# Patient Record
Sex: FEMALE | Race: BLACK OR AFRICAN AMERICAN | URBAN - METROPOLITAN AREA
[De-identification: names, ages, dates, MRNs, and addresses within clinical notes are randomized per-mention and may not be internally consistent; named-entity substitution may affect disease eponyms.]

---

## 2017-04-25 ENCOUNTER — ON CAMPUS - OUTPATIENT (OUTPATIENT)
Dept: URBAN - METROPOLITAN AREA HOSPITAL 63 | Facility: HOSPITAL | Age: 62
End: 2017-04-25

## 2017-04-25 DIAGNOSIS — K76.6 PORTAL HYPERTENSION: ICD-10-CM

## 2017-04-25 DIAGNOSIS — K21.9 GASTRO-ESOPHAGEAL REFLUX DISEASE WITHOUT ESOPHAGITIS: ICD-10-CM

## 2017-04-25 PROCEDURE — 43235 EGD DIAGNOSTIC BRUSH WASH: CPT

## 2019-03-08 ENCOUNTER — OFFICE (OUTPATIENT)
Dept: URBAN - METROPOLITAN AREA CLINIC 78 | Facility: CLINIC | Age: 64
End: 2019-03-08

## 2019-03-08 VITALS
DIASTOLIC BLOOD PRESSURE: 97 MMHG | HEART RATE: 94 BPM | TEMPERATURE: 98.5 F | HEIGHT: 64 IN | WEIGHT: 250 LBS | SYSTOLIC BLOOD PRESSURE: 152 MMHG

## 2019-03-08 DIAGNOSIS — Z86.010 PERSONAL HISTORY OF COLONIC POLYPS: ICD-10-CM

## 2019-03-08 DIAGNOSIS — I86.8 VARICOSE VEINS OF OTHER SPECIFIED SITES: ICD-10-CM

## 2019-03-08 DIAGNOSIS — K31.9 DISEASE OF STOMACH AND DUODENUM, UNSPECIFIED: ICD-10-CM

## 2019-03-08 DIAGNOSIS — K76.9 LIVER DISEASE, UNSPECIFIED: ICD-10-CM

## 2019-03-08 PROCEDURE — 99214 OFFICE O/P EST MOD 30 MIN: CPT

## 2019-03-08 NOTE — SERVICEHPINOTES
Ms. Ruiz is here for follow up. She follows with Zane Greer for "early cirrhosis" of the liver caused by previous history of hepatitis C. She states that she sees Zane every six month for follow ups and HCC surveillance. She is here today to schedule an EGD. Last EGD 2 years ago showed small, non bleeding varix in the duodenum and portal HTN gastropathy. She has a hx of GERD but  3 x per week and using PPI just as needed. She gets very infrequent nocturnal GERD usually if she eats a trigger food and then goes to bed. She denies other UGI issues such as N/V, anorexia, pain, black stool, hematemesis, etc. She is also due for a colonoscopy given personal history of a tubular adenoma removed in 2007. She has some days where her stool is "perfectly formed and other days it is watery with urgency". No blood in her stool. No known heart issues. Recent CBC shows slightly high platelet count. No family hx of CRC.

## 2019-04-29 ENCOUNTER — AMBULATORY SURGICAL CENTER (OUTPATIENT)
Dept: URBAN - METROPOLITAN AREA SURGERY 21 | Facility: SURGERY | Age: 64
End: 2019-04-29
Payer: COMMERCIAL

## 2019-04-29 DIAGNOSIS — K29.60 OTHER GASTRITIS WITHOUT BLEEDING: ICD-10-CM

## 2019-04-29 DIAGNOSIS — K74.60 UNSPECIFIED CIRRHOSIS OF LIVER: ICD-10-CM

## 2019-04-29 DIAGNOSIS — K22.10 ULCER OF ESOPHAGUS WITHOUT BLEEDING: ICD-10-CM

## 2019-04-29 DIAGNOSIS — Z86.010 PERSONAL HISTORY OF COLONIC POLYPS: ICD-10-CM

## 2019-04-29 PROCEDURE — 43239 EGD BIOPSY SINGLE/MULTIPLE: CPT

## 2019-04-29 PROCEDURE — 45378 DIAGNOSTIC COLONOSCOPY: CPT

## 2021-01-12 ENCOUNTER — OFFICE (OUTPATIENT)
Dept: URBAN - METROPOLITAN AREA TELEHEALTH 3 | Facility: TELEHEALTH | Age: 66
End: 2021-01-12
Payer: COMMERCIAL

## 2021-01-12 VITALS — WEIGHT: 245 LBS | HEIGHT: 64 IN

## 2021-01-12 DIAGNOSIS — D50.9 IRON DEFICIENCY ANEMIA, UNSPECIFIED: ICD-10-CM

## 2021-01-12 DIAGNOSIS — K74.60 UNSPECIFIED CIRRHOSIS OF LIVER: ICD-10-CM

## 2021-01-12 DIAGNOSIS — B18.2 CHRONIC VIRAL HEPATITIS C: ICD-10-CM

## 2021-01-12 DIAGNOSIS — R13.19 OTHER DYSPHAGIA: ICD-10-CM

## 2021-01-12 PROCEDURE — 99442: CPT | Performed by: INTERNAL MEDICINE

## 2021-01-12 NOTE — SERVICEHPINOTES
PATIENT VERIFIED BY DATE OF BIRTH AND NAME. Patient has been consented for this telecommunication visit.   66 yo fm with rare dysphagia and new anemia. Pt here for for fu. Has cirrhosis - last EGD 4/2019. Pt states has dysphagia with pills mostly, sometimes dry chicken breast and rice. Pt denies any blood in her stool or dark stools. Pt doing well o/w. Did require an iron infusion. ROS o/w negative.

## 2021-03-09 ENCOUNTER — ON CAMPUS - OUTPATIENT (OUTPATIENT)
Dept: URBAN - METROPOLITAN AREA HOSPITAL 16 | Facility: HOSPITAL | Age: 66
End: 2021-03-09

## 2021-03-09 DIAGNOSIS — D50.9 IRON DEFICIENCY ANEMIA, UNSPECIFIED: ICD-10-CM

## 2021-03-09 DIAGNOSIS — K74.60 UNSPECIFIED CIRRHOSIS OF LIVER: ICD-10-CM

## 2021-03-09 DIAGNOSIS — K29.60 OTHER GASTRITIS WITHOUT BLEEDING: ICD-10-CM

## 2021-03-09 DIAGNOSIS — R13.10 DYSPHAGIA, UNSPECIFIED: ICD-10-CM

## 2021-03-09 DIAGNOSIS — K20.80 OTHER ESOPHAGITIS WITHOUT BLEEDING: ICD-10-CM

## 2021-03-09 PROCEDURE — 43450 DILATE ESOPHAGUS 1/MULT PASS: CPT | Performed by: INTERNAL MEDICINE

## 2021-03-09 PROCEDURE — 43239 EGD BIOPSY SINGLE/MULTIPLE: CPT | Performed by: INTERNAL MEDICINE

## 2021-07-26 ENCOUNTER — OFFICE (OUTPATIENT)
Dept: URBAN - METROPOLITAN AREA CLINIC 79 | Facility: CLINIC | Age: 66
End: 2021-07-26
Payer: COMMERCIAL

## 2021-07-26 VITALS
TEMPERATURE: 96.5 F | WEIGHT: 240 LBS | HEART RATE: 97 BPM | HEIGHT: 64 IN | SYSTOLIC BLOOD PRESSURE: 129 MMHG | DIASTOLIC BLOOD PRESSURE: 89 MMHG

## 2021-07-26 DIAGNOSIS — K31.89 OTHER DISEASES OF STOMACH AND DUODENUM: ICD-10-CM

## 2021-07-26 DIAGNOSIS — K21.9 GASTRO-ESOPHAGEAL REFLUX DISEASE WITHOUT ESOPHAGITIS: ICD-10-CM

## 2021-07-26 DIAGNOSIS — R13.19 OTHER DYSPHAGIA: ICD-10-CM

## 2021-07-26 PROCEDURE — 99214 OFFICE O/P EST MOD 30 MIN: CPT | Performed by: PHYSICIAN ASSISTANT

## 2021-07-26 NOTE — SERVICEHPINOTES
Ms. Ruiz is here for f/u to recent EGD. EGD was ordered by Dr. Arriola d/t dysphagia. He found a nonobstructing ring which was dilated, gastritis, and a small hiatal hernia, and gastric IM, given a 1 yr recall. She continues to have dysphagia especially to meat and rice. She is taking a generic acid reducer which "isn't working that well". Only feels acid reflux a few times per month. She tries to follow a GERD diet. She avoids eating and laying down. She has cirrhosis of the liver and follows regularly with Zane Greer for this. No other UGI issues.

## 2021-07-29 LAB — H PYLORI BREATH TEST: NEGATIVE

## 2021-11-09 ENCOUNTER — ON CAMPUS - OUTPATIENT (OUTPATIENT)
Dept: URBAN - METROPOLITAN AREA HOSPITAL 37 | Facility: HOSPITAL | Age: 66
End: 2021-11-09
Payer: COMMERCIAL

## 2021-11-09 DIAGNOSIS — K22.4 DYSKINESIA OF ESOPHAGUS: ICD-10-CM

## 2021-11-09 PROCEDURE — 91010 ESOPHAGUS MOTILITY STUDY: CPT | Mod: 26 | Performed by: INTERNAL MEDICINE

## 2021-11-16 ENCOUNTER — OFFICE (OUTPATIENT)
Dept: URBAN - METROPOLITAN AREA TELEHEALTH 7 | Facility: TELEHEALTH | Age: 66
End: 2021-11-16
Payer: COMMERCIAL

## 2021-11-16 VITALS — HEIGHT: 64 IN | WEIGHT: 240 LBS

## 2021-11-16 DIAGNOSIS — K21.9 GASTRO-ESOPHAGEAL REFLUX DISEASE WITHOUT ESOPHAGITIS: ICD-10-CM

## 2021-11-16 DIAGNOSIS — R13.19 OTHER DYSPHAGIA: ICD-10-CM

## 2021-11-16 DIAGNOSIS — K74.60 UNSPECIFIED CIRRHOSIS OF LIVER: ICD-10-CM

## 2021-11-16 PROCEDURE — 99214 OFFICE O/P EST MOD 30 MIN: CPT | Mod: 95 | Performed by: PHYSICIAN ASSISTANT

## 2021-11-16 NOTE — SERVICEHPINOTES
PATIENT VERIFIED BY DATE OF BIRTH AND NAME. Patient has been consented for this telecommunication visit.   Ms. Ruiz is here for f/u regarding dysphagia. She underwent an EGD this past summer which showed   a nonobstructing ring which was dilated, gastritis, and a small hiatal hernia, and gastric IM, given a 1 yr recall. She doesn't feel as though dilating the ring this past time helped much. She continues to have dysphagia especially to meat and rice. Dysphagia is chronic but  intermittent doesn't occur every day. We obtained esophageal manometry testing which showed mild EGJ outflow obstruction. She is trying to adhere to a GERD diet. She takes Nexium which doesn't help that much with reflux. She has never tried Dexilant. No other GI related complaints today. ROS as per HPI and o/w is unremarkable.

## 2023-05-26 ENCOUNTER — OFFICE (OUTPATIENT)
Dept: URBAN - METROPOLITAN AREA CLINIC 79 | Facility: CLINIC | Age: 68
End: 2023-05-26
Payer: COMMERCIAL

## 2023-05-26 VITALS
DIASTOLIC BLOOD PRESSURE: 84 MMHG | TEMPERATURE: 98.8 F | HEART RATE: 84 BPM | WEIGHT: 240 LBS | SYSTOLIC BLOOD PRESSURE: 138 MMHG | HEIGHT: 64 IN

## 2023-05-26 DIAGNOSIS — K21.9 GASTRO-ESOPHAGEAL REFLUX DISEASE WITHOUT ESOPHAGITIS: ICD-10-CM

## 2023-05-26 DIAGNOSIS — K74.60 UNSPECIFIED CIRRHOSIS OF LIVER: ICD-10-CM

## 2023-05-26 DIAGNOSIS — I86.8 VARICOSE VEINS OF OTHER SPECIFIED SITES: ICD-10-CM

## 2023-05-26 DIAGNOSIS — K31.89 OTHER DISEASES OF STOMACH AND DUODENUM: ICD-10-CM

## 2023-05-26 DIAGNOSIS — R19.7 DIARRHEA, UNSPECIFIED: ICD-10-CM

## 2023-05-26 LAB
AFP, SERUM, TUMOR MARKER: 4 NG/ML (ref 0–9.2)
CBC WITH DIFFERENTIAL/PLATELET: BASO (ABSOLUTE): 0.1 X10E3/UL (ref 0–0.2)
CBC WITH DIFFERENTIAL/PLATELET: BASOS: 1 %
CBC WITH DIFFERENTIAL/PLATELET: EOS (ABSOLUTE): 0.4 X10E3/UL (ref 0–0.4)
CBC WITH DIFFERENTIAL/PLATELET: EOS: 3 %
CBC WITH DIFFERENTIAL/PLATELET: HEMATOCRIT: 38 % (ref 34–46.6)
CBC WITH DIFFERENTIAL/PLATELET: HEMATOLOGY COMMENTS: (no result)
CBC WITH DIFFERENTIAL/PLATELET: HEMOGLOBIN: 12.6 G/DL (ref 11.1–15.9)
CBC WITH DIFFERENTIAL/PLATELET: IMMATURE CELLS: (no result)
CBC WITH DIFFERENTIAL/PLATELET: IMMATURE GRANS (ABS): 0.1 X10E3/UL (ref 0–0.1)
CBC WITH DIFFERENTIAL/PLATELET: IMMATURE GRANULOCYTES: 1 %
CBC WITH DIFFERENTIAL/PLATELET: LYMPHS (ABSOLUTE): 2.9 X10E3/UL (ref 0.7–3.1)
CBC WITH DIFFERENTIAL/PLATELET: LYMPHS: 27 %
CBC WITH DIFFERENTIAL/PLATELET: MCH: 28.4 PG (ref 26.6–33)
CBC WITH DIFFERENTIAL/PLATELET: MCHC: 33.2 G/DL (ref 31.5–35.7)
CBC WITH DIFFERENTIAL/PLATELET: MCV: 86 FL (ref 79–97)
CBC WITH DIFFERENTIAL/PLATELET: MONOCYTES(ABSOLUTE): 0.8 X10E3/UL (ref 0.1–0.9)
CBC WITH DIFFERENTIAL/PLATELET: MONOCYTES: 8 %
CBC WITH DIFFERENTIAL/PLATELET: NEUTROPHILS (ABSOLUTE): 6.6 X10E3/UL (ref 1.4–7)
CBC WITH DIFFERENTIAL/PLATELET: NEUTROPHILS: 60 %
CBC WITH DIFFERENTIAL/PLATELET: NRBC: (no result)
CBC WITH DIFFERENTIAL/PLATELET: PLATELETS: 213 X10E3/UL (ref 150–450)
CBC WITH DIFFERENTIAL/PLATELET: RBC: 4.43 X10E6/UL (ref 3.77–5.28)
CBC WITH DIFFERENTIAL/PLATELET: RDW: 14 % (ref 11.7–15.4)
CBC WITH DIFFERENTIAL/PLATELET: WBC: 10.8 X10E3/UL (ref 3.4–10.8)
COMP. METABOLIC PANEL (14): A/G RATIO: 1.7 (ref 1.2–2.2)
COMP. METABOLIC PANEL (14): ALBUMIN: 4.3 G/DL (ref 3.8–4.8)
COMP. METABOLIC PANEL (14): ALKALINE PHOSPHATASE: 73 IU/L (ref 44–121)
COMP. METABOLIC PANEL (14): ALT (SGPT): 7 IU/L (ref 0–32)
COMP. METABOLIC PANEL (14): AST (SGOT): 12 IU/L (ref 0–40)
COMP. METABOLIC PANEL (14): BILIRUBIN, TOTAL: 1 MG/DL (ref 0–1.2)
COMP. METABOLIC PANEL (14): BUN/CREATININE RATIO: 9 — LOW (ref 12–28)
COMP. METABOLIC PANEL (14): BUN: 10 MG/DL (ref 8–27)
COMP. METABOLIC PANEL (14): CALCIUM: 9.6 MG/DL (ref 8.7–10.3)
COMP. METABOLIC PANEL (14): CARBON DIOXIDE, TOTAL: 25 MMOL/L (ref 20–29)
COMP. METABOLIC PANEL (14): CHLORIDE: 101 MMOL/L (ref 96–106)
COMP. METABOLIC PANEL (14): CREATININE: 1.08 MG/DL — HIGH (ref 0.57–1)
COMP. METABOLIC PANEL (14): EGFR: 56 ML/MIN/1.73 — LOW (ref 59–?)
COMP. METABOLIC PANEL (14): GLOBULIN, TOTAL: 2.6 G/DL (ref 1.5–4.5)
COMP. METABOLIC PANEL (14): GLUCOSE: 103 MG/DL — HIGH (ref 70–99)
COMP. METABOLIC PANEL (14): POTASSIUM: 3.4 MMOL/L — LOW (ref 3.5–5.2)
COMP. METABOLIC PANEL (14): PROTEIN, TOTAL: 6.9 G/DL (ref 6–8.5)
COMP. METABOLIC PANEL (14): SODIUM: 142 MMOL/L (ref 134–144)
HCV FIBROSURE: ALPHA 2-MACROGLOBULINS, QN: 389 MG/DL — HIGH (ref 110–276)
HCV FIBROSURE: ALT (SGPT) P5P: 12 IU/L (ref 0–40)
HCV FIBROSURE: APOLIPOPROTEIN A-1: 160 MG/DL (ref 116–209)
HCV FIBROSURE: BILIRUBIN, TOTAL: 0.1 MG/DL (ref 0–1.2)
HCV FIBROSURE: COMMENT: (no result)
HCV FIBROSURE: FIBROSIS SCORE: 0.15 (ref 0–0.21)
HCV FIBROSURE: FIBROSIS SCORING: (no result)
HCV FIBROSURE: FIBROSIS STAGE: (no result)
HCV FIBROSURE: GGT: 14 IU/L (ref 0–60)
HCV FIBROSURE: HAPTOGLOBIN: 183 MG/DL (ref 37–355)
HCV FIBROSURE: INTERPRETATIONS: (no result)
HCV FIBROSURE: LIMITATIONS: (no result)
HCV FIBROSURE: METHODOLOGY: (no result)
HCV FIBROSURE: NECROINFLAMM ACTIVITY SCORING: (no result)
HCV FIBROSURE: NECROINFLAMMAT ACTIVITY GRADE: (no result)
HCV FIBROSURE: NECROINFLAMMAT ACTIVITY SCORE: 0.03 (ref 0–0.17)
HEMOGLOBIN A1C: 5.9 % — HIGH (ref 4.8–5.6)
PROTHROMBIN TIME (PT): INR: 1 (ref 0.9–1.2)
PROTHROMBIN TIME (PT): PROTHROMBIN TIME: 10.7 SEC (ref 9.1–12)

## 2023-05-26 PROCEDURE — 99215 OFFICE O/P EST HI 40 MIN: CPT | Performed by: PHYSICIAN ASSISTANT

## 2023-05-26 NOTE — SERVICEHPINOTES
67 yo female presents for f/u liver disease. She has h/o Hep C and possible cirrhosis. Has been following with [facility] but has been advised she can f/u with us at this point. S/p Hep C cure a number of years ago, and Fibroscan last year predicted no cirrhosis (F0-1) (as did one before this per their notes) but other imaging has apparently suggested possible cirrhosis/portal HTN. She is due for EGD as last one was in 2021 with nonobstructing ring which was dilated, gastritis, and a small hiatal hernia, and gastric IM. Possible early duodenal varices noted. 
nikita shelton She has h/o acid reflux and uses an OTC med which is working well for her currently. nikita shelton She has tendency for chronic diarrhea, 2-3 BMs/day. Worse if she drinks milk. No blood in stools. No abdominal pain. No N/V, fevers, weight loss. Sometimes sees food particles in stools. She is concerned about her diarrhea as her mother had Crohn's disease. Her last colonoscopy was in 2019 with 5- year recall advised, and her diarrhea was going on prior to this. She is also s/p cholecystectomy in past. nikita shelton She denies h/o heart disease. Doing well overall. 
nikita shelton She has h/o normal platelets and LFTs. nikita

## 2023-05-26 NOTE — INTERFACERESULTNOTES
For low elastase, can indicate EPI (pancreatic insufficiency) - prescribe Zenpep 40k lipase, 2 PO with meals, 1 PO with snacks, #300, 5 RF. For elevated fecal calpro, need to schedule a colonoscopy for further evaluation. Please also schedule f/u visit for July.

## 2023-06-02 LAB
CALPROTECTIN, FECAL: 450 UG/G — HIGH (ref 0–120)
PANCREATIC ELASTASE, FECAL: 106 UG ELAST./G — LOW (ref 200–?)

## 2023-06-06 ENCOUNTER — ON CAMPUS - OUTPATIENT (OUTPATIENT)
Dept: URBAN - METROPOLITAN AREA HOSPITAL 16 | Facility: HOSPITAL | Age: 68
End: 2023-06-06
Payer: COMMERCIAL

## 2023-06-06 DIAGNOSIS — K29.60 OTHER GASTRITIS WITHOUT BLEEDING: ICD-10-CM

## 2023-06-06 DIAGNOSIS — K76.6 PORTAL HYPERTENSION: ICD-10-CM

## 2023-06-06 DIAGNOSIS — K74.60 UNSPECIFIED CIRRHOSIS OF LIVER: ICD-10-CM

## 2023-06-06 PROCEDURE — 43239 EGD BIOPSY SINGLE/MULTIPLE: CPT | Performed by: INTERNAL MEDICINE

## 2023-06-26 PROBLEM — R19.5 ELEVATED FECAL CALPROTECTIN: Status: ACTIVE | Noted: 2023-06-26

## 2023-06-29 ENCOUNTER — OFFICE (OUTPATIENT)
Dept: URBAN - METROPOLITAN AREA CLINIC 79 | Facility: CLINIC | Age: 68
End: 2023-06-29

## 2023-06-29 PROCEDURE — 00031: CPT | Performed by: INTERNAL MEDICINE

## 2023-07-28 ENCOUNTER — ON CAMPUS - OUTPATIENT (OUTPATIENT)
Dept: URBAN - METROPOLITAN AREA HOSPITAL 65 | Facility: HOSPITAL | Age: 68
End: 2023-07-28
Payer: COMMERCIAL

## 2023-07-28 DIAGNOSIS — K59.9 FUNCTIONAL INTESTINAL DISORDER, UNSPECIFIED: ICD-10-CM

## 2023-07-28 DIAGNOSIS — R19.5 OTHER FECAL ABNORMALITIES: ICD-10-CM

## 2023-07-28 DIAGNOSIS — Z86.010 PERSONAL HISTORY OF COLONIC POLYPS: ICD-10-CM

## 2023-07-28 DIAGNOSIS — D12.2 BENIGN NEOPLASM OF ASCENDING COLON: ICD-10-CM

## 2023-07-28 DIAGNOSIS — K63.5 POLYP OF COLON: ICD-10-CM

## 2023-07-28 DIAGNOSIS — D12.0 BENIGN NEOPLASM OF CECUM: ICD-10-CM

## 2023-07-28 DIAGNOSIS — K57.30 DIVERTICULOSIS OF LARGE INTESTINE WITHOUT PERFORATION OR ABS: ICD-10-CM

## 2023-07-28 PROCEDURE — 45385 COLONOSCOPY W/LESION REMOVAL: CPT | Performed by: INTERNAL MEDICINE

## 2023-07-28 PROCEDURE — 45380 COLONOSCOPY AND BIOPSY: CPT | Mod: 59 | Performed by: INTERNAL MEDICINE

## 2023-08-24 ENCOUNTER — TELEHEALTH PROVIDED OTHER THAN IN PATIENT'S HOME (OUTPATIENT)
Dept: URBAN - METROPOLITAN AREA TELEHEALTH 7 | Facility: TELEHEALTH | Age: 68
End: 2023-08-24
Payer: COMMERCIAL

## 2023-08-24 VITALS — WEIGHT: 240 LBS | HEIGHT: 64 IN

## 2023-08-24 DIAGNOSIS — K74.60 UNSPECIFIED CIRRHOSIS OF LIVER: ICD-10-CM

## 2023-08-24 DIAGNOSIS — K31.89 OTHER DISEASES OF STOMACH AND DUODENUM: ICD-10-CM

## 2023-08-24 DIAGNOSIS — R19.7 DIARRHEA, UNSPECIFIED: ICD-10-CM

## 2023-08-24 DIAGNOSIS — K58.0 IRRITABLE BOWEL SYNDROME WITH DIARRHEA: ICD-10-CM

## 2023-08-24 DIAGNOSIS — I86.8 VARICOSE VEINS OF OTHER SPECIFIED SITES: ICD-10-CM

## 2023-08-24 PROCEDURE — 99215 OFFICE O/P EST HI 40 MIN: CPT | Mod: 95 | Performed by: PHYSICIAN ASSISTANT

## 2023-08-24 RX ORDER — RIFAXIMIN 550 MG/1
TABLET ORAL
Qty: 42 | Refills: 0 | Status: COMPLETED
Start: 2023-08-24 | End: 2023-10-25

## 2023-08-24 NOTE — INTERFACERESULTNOTES
Stool tests are negative, and the one that was high previously (fecal calprotectin - marker for inflammation) is now also negative/normal. Continue plan from recent visit (Xifaxan prescription) and schedule f/u for October (or sooner if needed)

## 2023-08-24 NOTE — SERVICENOTES
Patient's visit was conducted through [employer] video telecommunication. Patient consented before the start of visit as to understanding of privacy concerns, possible technological failure, and their responsibility of carrying out instructions of plan.
normal...

## 2023-08-24 NOTE — SERVICEHPINOTES
PATIENT VERIFIED BY DATE OF BIRTH AND NAME. Patient has been consented for this telecommunication visit.
br
br68 yo female presents for f/u liver disease and diarrhea. Today her main complaint is in regards to her diarrhea. Chronic issue, but has gotten worse over time. Her fecal elastase was low (though may have been dilutional effect) and pancreatic enzymes not covered by her insurance. Her fecal calprotectin level was high but her colonoscopy was negative for any evidence of IBD or inflammatory findings. She continues to have 2-3 BMs/day. Can have cramping and urgency and this impacts her desire to go out. Worse if she drinks milk. No blood in stools. She has gas and bloating issues as well. 
br
Junitohe is s/p cholecystectomy in past. 
nikita shelton In regards to her liver disease, she has h/o Hep C and possible cirrhosis. Has been following with [facility] but was released to just f/u with us now. S/p Hep C cure a number of years ago, and Fibroscan last year predicted no cirrhosis (F0-1) (as did one before this per their notes) but other imaging has apparently suggested possible cirrhosis/portal HTN. 
br
nikita EGD in 2021 with nonobstructing ring which was dilated, gastritis, and a small hiatal hernia, and gastric IM. Possible early duodenal varices noted.
nikita shelton Recent updated EGD showed grade II duodenal varices and gastric intestinal metaplasia again. Recall in 1 year. Esophagus was normal.She has h/o acid reflux and uses an OTC med which works well.
 Has lost a few pounds recently - states possibly related to loss of her mother. She denies h/o heart disease. Doing well overall. She has h/o normal platelets and LFTs.
br
br 
ROS as above, otherwise negative.br
br
6/2/23 elastase 106, fecal calpro 450
br 5/26/23 plt 213, HCV Fibrosure - F0, K+ 3.4, creat 1.08, alb 4.3, bili 1.0, AST/ALT 12/7, INR 1.0, HgbA1C 5.9, AFP 4.0br visited="true"

## 2023-08-30 LAB
C DIFFICILE TOXIN GENE NAA: NEGATIVE
CALPROTECTIN, FECAL: <5 UG/G
GIARDIA LAMBLIA AG, EIA: NEGATIVE
OVA + PARASITE EXAM: (no result)
OVA + PARASITE EXAM: (no result)
RESULT: RESULT 1: (no result)
STOOL CULTURE: CAMPYLOBACTER CULTURE: (no result)
STOOL CULTURE: E COLI SHIGA TOXIN EIA: NEGATIVE
STOOL CULTURE: SALMONELLA/SHIGELLA SCREEN: (no result)

## 2023-08-31 LAB
Lab: (no result)
ONE SPECIMEN IDENTIFIER: (no result)
OVA + PARASITE EXAM: (no result)
RESULT: RESULT 1: (no result)

## 2023-10-25 ENCOUNTER — OFFICE (OUTPATIENT)
Dept: URBAN - METROPOLITAN AREA CLINIC 79 | Facility: CLINIC | Age: 68
End: 2023-10-25
Payer: COMMERCIAL

## 2023-10-25 VITALS
TEMPERATURE: 97.6 F | HEIGHT: 64 IN | HEART RATE: 80 BPM | WEIGHT: 245 LBS | SYSTOLIC BLOOD PRESSURE: 142 MMHG | DIASTOLIC BLOOD PRESSURE: 76 MMHG

## 2023-10-25 DIAGNOSIS — R19.7 DIARRHEA, UNSPECIFIED: ICD-10-CM

## 2023-10-25 DIAGNOSIS — I86.8 VARICOSE VEINS OF OTHER SPECIFIED SITES: ICD-10-CM

## 2023-10-25 DIAGNOSIS — Z86.010 PERSONAL HISTORY OF COLONIC POLYPS: ICD-10-CM

## 2023-10-25 DIAGNOSIS — K74.60 UNSPECIFIED CIRRHOSIS OF LIVER: ICD-10-CM

## 2023-10-25 PROCEDURE — 99214 OFFICE O/P EST MOD 30 MIN: CPT | Performed by: PHYSICIAN ASSISTANT

## 2023-11-16 LAB
AFP, SERUM, TUMOR MARKER: 3.2 NG/ML (ref 0–9.2)
CBC WITH DIFFERENTIAL/PLATELET: BASO (ABSOLUTE): 0 X10E3/UL (ref 0–0.2)
CBC WITH DIFFERENTIAL/PLATELET: BASOS: 0 %
CBC WITH DIFFERENTIAL/PLATELET: EOS (ABSOLUTE): 0.3 X10E3/UL (ref 0–0.4)
CBC WITH DIFFERENTIAL/PLATELET: EOS: 3 %
CBC WITH DIFFERENTIAL/PLATELET: HEMATOCRIT: 36.8 % (ref 34–46.6)
CBC WITH DIFFERENTIAL/PLATELET: HEMATOLOGY COMMENTS: (no result)
CBC WITH DIFFERENTIAL/PLATELET: HEMOGLOBIN: 12.2 G/DL (ref 11.1–15.9)
CBC WITH DIFFERENTIAL/PLATELET: IMMATURE CELLS: (no result)
CBC WITH DIFFERENTIAL/PLATELET: IMMATURE GRANS (ABS): 0 X10E3/UL (ref 0–0.1)
CBC WITH DIFFERENTIAL/PLATELET: IMMATURE GRANULOCYTES: 0 %
CBC WITH DIFFERENTIAL/PLATELET: LYMPHS (ABSOLUTE): 3.5 X10E3/UL — HIGH (ref 0.7–3.1)
CBC WITH DIFFERENTIAL/PLATELET: LYMPHS: 34 %
CBC WITH DIFFERENTIAL/PLATELET: MCH: 28.5 PG (ref 26.6–33)
CBC WITH DIFFERENTIAL/PLATELET: MCHC: 33.2 G/DL (ref 31.5–35.7)
CBC WITH DIFFERENTIAL/PLATELET: MCV: 86 FL (ref 79–97)
CBC WITH DIFFERENTIAL/PLATELET: MONOCYTES(ABSOLUTE): 0.6 X10E3/UL (ref 0.1–0.9)
CBC WITH DIFFERENTIAL/PLATELET: MONOCYTES: 6 %
CBC WITH DIFFERENTIAL/PLATELET: NEUTROPHILS (ABSOLUTE): 5.9 X10E3/UL (ref 1.4–7)
CBC WITH DIFFERENTIAL/PLATELET: NEUTROPHILS: 57 %
CBC WITH DIFFERENTIAL/PLATELET: NRBC: (no result)
CBC WITH DIFFERENTIAL/PLATELET: PLATELETS: 177 X10E3/UL (ref 150–450)
CBC WITH DIFFERENTIAL/PLATELET: RBC: 4.28 X10E6/UL (ref 3.77–5.28)
CBC WITH DIFFERENTIAL/PLATELET: RDW: 14.7 % (ref 11.7–15.4)
CBC WITH DIFFERENTIAL/PLATELET: WBC: 10.5 X10E3/UL (ref 3.4–10.8)
COMP. METABOLIC PANEL (14): A/G RATIO: 2.1 (ref 1.2–2.2)
COMP. METABOLIC PANEL (14): ALBUMIN: 4.2 G/DL (ref 3.9–4.9)
COMP. METABOLIC PANEL (14): ALKALINE PHOSPHATASE: 57 IU/L (ref 44–121)
COMP. METABOLIC PANEL (14): ALT (SGPT): 13 IU/L (ref 0–32)
COMP. METABOLIC PANEL (14): AST (SGOT): 13 IU/L (ref 0–40)
COMP. METABOLIC PANEL (14): BILIRUBIN, TOTAL: 0.6 MG/DL (ref 0–1.2)
COMP. METABOLIC PANEL (14): BUN/CREATININE RATIO: 13 (ref 12–28)
COMP. METABOLIC PANEL (14): BUN: 14 MG/DL (ref 8–27)
COMP. METABOLIC PANEL (14): CALCIUM: 9.4 MG/DL (ref 8.7–10.3)
COMP. METABOLIC PANEL (14): CARBON DIOXIDE, TOTAL: 22 MMOL/L (ref 20–29)
COMP. METABOLIC PANEL (14): CHLORIDE: 108 MMOL/L — HIGH (ref 96–106)
COMP. METABOLIC PANEL (14): CREATININE: 1.04 MG/DL — HIGH (ref 0.57–1)
COMP. METABOLIC PANEL (14): EGFR: 59 ML/MIN/1.73 — LOW (ref 59–?)
COMP. METABOLIC PANEL (14): GLOBULIN, TOTAL: 2 G/DL (ref 1.5–4.5)
COMP. METABOLIC PANEL (14): GLUCOSE: 85 MG/DL (ref 70–99)
COMP. METABOLIC PANEL (14): POTASSIUM: 4.2 MMOL/L (ref 3.5–5.2)
COMP. METABOLIC PANEL (14): PROTEIN, TOTAL: 6.2 G/DL (ref 6–8.5)
COMP. METABOLIC PANEL (14): SODIUM: 145 MMOL/L — HIGH (ref 134–144)
PROTHROMBIN TIME (PT): INR: 1 (ref 0.9–1.2)
PROTHROMBIN TIME (PT): PROTHROMBIN TIME: 10.9 SEC (ref 9.1–12)

## 2024-06-13 ENCOUNTER — TELEHEALTH PROVIDED OTHER THAN IN PATIENT'S HOME (OUTPATIENT)
Dept: URBAN - METROPOLITAN AREA TELEHEALTH 7 | Facility: TELEHEALTH | Age: 69
End: 2024-06-13
Payer: MEDICARE

## 2024-06-13 VITALS — WEIGHT: 245 LBS | HEIGHT: 64 IN

## 2024-06-13 DIAGNOSIS — I86.8 VARICOSE VEINS OF OTHER SPECIFIED SITES: ICD-10-CM

## 2024-06-13 DIAGNOSIS — Z86.010 PERSONAL HISTORY OF COLONIC POLYPS: ICD-10-CM

## 2024-06-13 DIAGNOSIS — R19.7 DIARRHEA, UNSPECIFIED: ICD-10-CM

## 2024-06-13 DIAGNOSIS — K74.60 UNSPECIFIED CIRRHOSIS OF LIVER: ICD-10-CM

## 2024-06-13 PROCEDURE — 99214 OFFICE O/P EST MOD 30 MIN: CPT | Mod: 95 | Performed by: PHYSICIAN ASSISTANT

## 2024-06-13 NOTE — SERVICEHPINOTES
PATIENT VERIFIED BY DATE OF BIRTH AND NAME. Patient has been consented for this telecommunication visit using [employer] application.
br
br68 yo female presents for f/u liver disease and diarrhea. She continues to do much better in terms of diarrhea. Last year, responded well from combination of Xifaxan treatment, MegaSporeBiotic probiotics, and avoiding milk. Overall much better and able to get out more. No more cramping and urgency. Gas and bloating issues are also much better. F/U stool tests were negative, including fecal calpro (previously elevated). Colonoscopy 7/28/23 with benign findings 3-year recall due to adenomatous polyps. In regards to her liver disease, she has h/o Hep C and possible cirrhosis (vs. portal HTN without cirrhosis?). Has been following with [facility] but was released to just f/u with us as of earlier 2023. S/p Hep C cure a number of years ago, and Fibroscan 2022 predicted no cirrhosis (F0-1) (as did one before this per their notes) but other imaging has apparently suggested possible cirrhosis/portal HTN.
br
brShe had updated labs in May with intact LFTs and report a recent U/S as well, though we didn't get a copy of the report. EGD in 2021 with nonobstructing ring which was dilated, gastritis, and a small hiatal hernia, and gastric IM. Possible early duodenal varices noted.Updated EGD June 2023 showed grade II duodenal varices and gastric intestinal metaplasia again. Recall in 1 year. Esophagus was normal.She has h/o acid reflux and uses an OTC med which works well.She denies h/o heart disease. Doing well overall.She has h/o normal platelets and LFTs.
br
br ROS as above, otherwise negative. br
br5/6/24 plt 201, creat 1.03, AST/ALT 19/12, INR 1.0, AFP 3.9
br 11/16/23 plt 177, Na 145, creat 1.04, alb 4.2, bili 0.6, AST/ALT 13/13, INR 1.0, AFP 3.2br8/30/23 stool culture neg, fecal calpro &lt5, C diff neg, O&ampP x3 neg, Giardia ag negbr6/2/23 elastase 106, fecal calpro 450br5/26/23 plt 213, HCV Fibrosure - F0, K+ 3.4, creat 1.08, alb 4.3, bili 1.0, AST/ALT 12/7, INR 1.0, HgbA1C 5.9, AFP 4.0

## 2024-06-13 NOTE — SERVICENOTES
Patient was located in a public place,but will have privacy and wi-fi connection during visit., Patient's visit was conducted through [employer] video telecommunication. Patient consented before the start of visit as to understanding of privacy concerns, possible technological failure, and their responsibility of carrying out instructions of plan., I spent 20 minutes today reviewing the chart, talking with the patient, reviewing previous results with patient, discussing plan, and documenting. Patient understands and agrees with plan. Questions/concerns addressed.

## 2024-07-30 ENCOUNTER — ON CAMPUS - OUTPATIENT (OUTPATIENT)
Dept: URBAN - METROPOLITAN AREA HOSPITAL 16 | Facility: HOSPITAL | Age: 69
End: 2024-07-30
Payer: MEDICARE

## 2024-07-30 DIAGNOSIS — Z87.19 PERSONAL HISTORY OF OTHER DISEASES OF THE DIGESTIVE SYSTEM: ICD-10-CM

## 2024-07-30 DIAGNOSIS — K29.50 UNSPECIFIED CHRONIC GASTRITIS WITHOUT BLEEDING: ICD-10-CM

## 2024-07-30 DIAGNOSIS — R12 HEARTBURN: ICD-10-CM

## 2024-07-30 PROCEDURE — 43239 EGD BIOPSY SINGLE/MULTIPLE: CPT | Performed by: INTERNAL MEDICINE

## 2025-01-16 ENCOUNTER — TELEHEALTH PROVIDED OTHER THAN IN PATIENT'S HOME (OUTPATIENT)
Dept: URBAN - METROPOLITAN AREA TELEHEALTH 7 | Facility: TELEHEALTH | Age: 70
End: 2025-01-16
Payer: MEDICARE

## 2025-01-16 VITALS — WEIGHT: 230 LBS | HEIGHT: 64 IN

## 2025-01-16 DIAGNOSIS — K58.0 IRRITABLE BOWEL SYNDROME WITH DIARRHEA: ICD-10-CM

## 2025-01-16 DIAGNOSIS — R19.7 DIARRHEA, UNSPECIFIED: ICD-10-CM

## 2025-01-16 DIAGNOSIS — Z86.0101 PERSONAL HISTORY OF ADENOMATOUS AND SERRATED COLON POLYPS: ICD-10-CM

## 2025-01-16 DIAGNOSIS — K74.60 UNSPECIFIED CIRRHOSIS OF LIVER: ICD-10-CM

## 2025-01-16 DIAGNOSIS — I86.8 VARICOSE VEINS OF OTHER SPECIFIED SITES: ICD-10-CM

## 2025-01-16 PROCEDURE — 99214 OFFICE O/P EST MOD 30 MIN: CPT | Mod: 95 | Performed by: PHYSICIAN ASSISTANT

## 2025-01-16 RX ORDER — RIFAXIMIN 550 MG/1
TABLET ORAL
Qty: 42 | Refills: 0 | Status: ACTIVE
Start: 2025-01-16

## 2025-01-16 NOTE — SERVICEHPINOTES
PATIENT VERIFIED BY DATE OF BIRTH AND NAME. Patient has been consented for this telecommunication visit using [employer] application.
br
br69 yo female presents for f/u liver disease and diarrhea. Today is a routine follow-up on labs, U/S, and prior EGD. 
nikita shelton She notes worsened diarrhea issues again - post-prandial, can have cramping and urgency, gas and bloating. Denies food particles in stools. Responded well to Xifaxan in 2023. nikita shelton Colonoscopy 7/28/23 with benign findings 3-year recall due to adenomatous polyps.In regards to her liver disease, she has h/o Hep C and possible cirrhosis (vs. portal HTN without cirrhosis?). Has been following with [facility] but was released to just f/u with us as of earlier 2023. S/p Hep C cure a number of years ago, and Fibroscan 2022 predicted no cirrhosis (F0-1) (as did one before this per their notes) but other imaging has apparently suggested possible cirrhosis/portal HTN.
br
nikita Labs in November note an ELF in mid-risk category, also suggesting no cirrhosis. 
br

br U/S in November - negative for liver lesion.br 
br EGDs: 
br-EGD 7/30/24 - normal esophagus and duodenum no gastric IM this time recall 3 years 
br -EGD June 2023 showed grade II duodenal varices and gastric intestinal metaplasia again. Recall in 1 year. Esophagus was normal.br-EGD in 2021 with nonobstructing ring which was dilated, gastritis, and a small hiatal hernia, and gastric IM. Possible early duodenal varices noted.
br Junitohe has h/o acid reflux and uses an OTC med which works well.She denies h/o heart disease. Doing well overall.She has h/o normal platelets and LFTs.ROS as above, otherwise negative. nikita shelton 11/19/24 WBC 11.4, plt 184, creat 1.34, ELF 10.8 (mid-risk), INR 1.0, AFP 4.7br5/6/24 plt 201, creat 1.03, AST/ALT 19/12, INR 1.0, AFP 3.9br11/16/23 plt 177, Na 145, creat 1.04, alb 4.2, bili 0.6, AST/ALT 13/13, INR 1.0, AFP 3.2br8/30/23 stool culture neg, fecal calpro &lt5, C diff neg, O&ampP x3 neg, Giardia ag negbr6/2/23 elastase 106, fecal calpro 450br5/26/23 plt 213, HCV Fibrosure - F0, K+ 3.4, creat 1.08, alb 4.3, bili 1.0, AST/ALT 12/7, INR 1.0, HgbA1C 5.9, AFP 4.0

## 2025-01-16 NOTE — SERVICENOTES
Patient was located in their car during visit., Patient's visit was conducted through [employer] video telecommunication. Patient consented before the start of visit as to understanding of privacy concerns, possible technological failure, and their responsibility of carrying out instructions of plan., I spent 25 minutes today reviewing the chart, talking with the patient, reviewing previous results with patient, discussing plan, and documenting. Patient understands and agrees with plan. Questions/concerns addressed.